# Patient Record
Sex: FEMALE | Race: WHITE | NOT HISPANIC OR LATINO | URBAN - METROPOLITAN AREA
[De-identification: names, ages, dates, MRNs, and addresses within clinical notes are randomized per-mention and may not be internally consistent; named-entity substitution may affect disease eponyms.]

---

## 2017-02-24 RX ORDER — PITAVASTATIN CALCIUM 1.04 MG/1
0 TABLET, FILM COATED ORAL
Qty: 90 | Refills: 0 | COMMUNITY
Start: 2017-02-24

## 2017-04-03 ENCOUNTER — INPATIENT (INPATIENT)
Facility: HOSPITAL | Age: 77
LOS: 1 days | Discharge: HOME | End: 2017-04-05
Attending: HOSPITALIST | Admitting: FAMILY MEDICINE
Payer: MEDICARE

## 2017-04-03 VITALS — WEIGHT: 123.9 LBS | HEIGHT: 62 IN

## 2017-04-03 DIAGNOSIS — Z90.49 ACQUIRED ABSENCE OF OTHER SPECIFIED PARTS OF DIGESTIVE TRACT: Chronic | ICD-10-CM

## 2017-04-03 DIAGNOSIS — E78.5 HYPERLIPIDEMIA, UNSPECIFIED: ICD-10-CM

## 2017-04-03 DIAGNOSIS — I21.4 NON-ST ELEVATION (NSTEMI) MYOCARDIAL INFARCTION: ICD-10-CM

## 2017-04-03 LAB
ALBUMIN SERPL ELPH-MCNC: 3.3 G/DL — SIGNIFICANT CHANGE UP (ref 3.3–5)
ALP SERPL-CCNC: 70 U/L — SIGNIFICANT CHANGE UP (ref 40–120)
ALT FLD-CCNC: 15 U/L — SIGNIFICANT CHANGE UP (ref 12–78)
ANION GAP SERPL CALC-SCNC: 10 MMOL/L — SIGNIFICANT CHANGE UP (ref 5–17)
APTT BLD: 27.6 SEC — SIGNIFICANT CHANGE UP (ref 27.5–37.4)
APTT BLD: 30.6 SEC — SIGNIFICANT CHANGE UP (ref 27.5–37.4)
AST SERPL-CCNC: 28 U/L — SIGNIFICANT CHANGE UP (ref 15–37)
BASOPHILS # BLD AUTO: 0 K/UL — SIGNIFICANT CHANGE UP (ref 0–0.2)
BASOPHILS NFR BLD AUTO: 0.3 % — SIGNIFICANT CHANGE UP (ref 0–2)
BILIRUB SERPL-MCNC: 0.7 MG/DL — SIGNIFICANT CHANGE UP (ref 0.2–1.2)
BUN SERPL-MCNC: 13 MG/DL — SIGNIFICANT CHANGE UP (ref 7–23)
CALCIUM SERPL-MCNC: 8.6 MG/DL — SIGNIFICANT CHANGE UP (ref 8.5–10.1)
CHLORIDE SERPL-SCNC: 104 MMOL/L — SIGNIFICANT CHANGE UP (ref 96–108)
CK SERPL-CCNC: 53 U/L — SIGNIFICANT CHANGE UP (ref 26–192)
CO2 SERPL-SCNC: 26 MMOL/L — SIGNIFICANT CHANGE UP (ref 22–31)
CREAT SERPL-MCNC: 0.96 MG/DL — SIGNIFICANT CHANGE UP (ref 0.5–1.3)
D DIMER BLD IA.RAPID-MCNC: 339 NG/ML DDU — HIGH
EOSINOPHIL # BLD AUTO: 0 K/UL — SIGNIFICANT CHANGE UP (ref 0–0.5)
EOSINOPHIL NFR BLD AUTO: 0.1 % — SIGNIFICANT CHANGE UP (ref 0–6)
GLUCOSE SERPL-MCNC: 197 MG/DL — HIGH (ref 70–99)
HCT VFR BLD CALC: 35.6 % — SIGNIFICANT CHANGE UP (ref 34.5–45)
HCT VFR BLD CALC: 38.1 % — SIGNIFICANT CHANGE UP (ref 34.5–45)
HCT VFR BLD CALC: 38.7 % — SIGNIFICANT CHANGE UP (ref 34.5–45)
HGB BLD-MCNC: 12.2 G/DL — SIGNIFICANT CHANGE UP (ref 11.5–15.5)
HGB BLD-MCNC: 13.1 G/DL — SIGNIFICANT CHANGE UP (ref 11.5–15.5)
HGB BLD-MCNC: 13.4 G/DL — SIGNIFICANT CHANGE UP (ref 11.5–15.5)
INR BLD: 1.13 RATIO — SIGNIFICANT CHANGE UP (ref 0.88–1.16)
LYMPHOCYTES # BLD AUTO: 1.8 K/UL — SIGNIFICANT CHANGE UP (ref 1–3.3)
LYMPHOCYTES # BLD AUTO: 14.9 % — SIGNIFICANT CHANGE UP (ref 13–44)
MCHC RBC-ENTMCNC: 31.7 PG — SIGNIFICANT CHANGE UP (ref 27–34)
MCHC RBC-ENTMCNC: 31.8 PG — SIGNIFICANT CHANGE UP (ref 27–34)
MCHC RBC-ENTMCNC: 32.3 PG — SIGNIFICANT CHANGE UP (ref 27–34)
MCHC RBC-ENTMCNC: 34.3 GM/DL — SIGNIFICANT CHANGE UP (ref 32–36)
MCHC RBC-ENTMCNC: 34.5 GM/DL — SIGNIFICANT CHANGE UP (ref 32–36)
MCHC RBC-ENTMCNC: 34.7 GM/DL — SIGNIFICANT CHANGE UP (ref 32–36)
MCV RBC AUTO: 92.1 FL — SIGNIFICANT CHANGE UP (ref 80–100)
MCV RBC AUTO: 92.6 FL — SIGNIFICANT CHANGE UP (ref 80–100)
MCV RBC AUTO: 93 FL — SIGNIFICANT CHANGE UP (ref 80–100)
MONOCYTES # BLD AUTO: 0.7 K/UL — SIGNIFICANT CHANGE UP (ref 0–0.9)
MONOCYTES NFR BLD AUTO: 6 % — SIGNIFICANT CHANGE UP (ref 2–14)
NEUTROPHILS # BLD AUTO: 9.5 K/UL — HIGH (ref 1.8–7.4)
NEUTROPHILS NFR BLD AUTO: 78.6 % — HIGH (ref 43–77)
PLATELET # BLD AUTO: 185 K/UL — SIGNIFICANT CHANGE UP (ref 150–400)
PLATELET # BLD AUTO: 196 K/UL — SIGNIFICANT CHANGE UP (ref 150–400)
PLATELET # BLD AUTO: 211 K/UL — SIGNIFICANT CHANGE UP (ref 150–400)
POTASSIUM SERPL-MCNC: 3.7 MMOL/L — SIGNIFICANT CHANGE UP (ref 3.5–5.3)
POTASSIUM SERPL-SCNC: 3.7 MMOL/L — SIGNIFICANT CHANGE UP (ref 3.5–5.3)
PROT SERPL-MCNC: 7.3 GM/DL — SIGNIFICANT CHANGE UP (ref 6–8.3)
PROTHROM AB SERPL-ACNC: 12.2 SEC — SIGNIFICANT CHANGE UP (ref 9.8–12.7)
RBC # BLD: 3.85 M/UL — SIGNIFICANT CHANGE UP (ref 3.8–5.2)
RBC # BLD: 4.13 M/UL — SIGNIFICANT CHANGE UP (ref 3.8–5.2)
RBC # BLD: 4.16 M/UL — SIGNIFICANT CHANGE UP (ref 3.8–5.2)
RBC # FLD: 12.5 % — SIGNIFICANT CHANGE UP (ref 10.3–14.5)
RBC # FLD: 12.6 % — SIGNIFICANT CHANGE UP (ref 10.3–14.5)
RBC # FLD: 12.8 % — SIGNIFICANT CHANGE UP (ref 10.3–14.5)
SODIUM SERPL-SCNC: 140 MMOL/L — SIGNIFICANT CHANGE UP (ref 135–145)
TROPONIN I SERPL-MCNC: 0.83 NG/ML — HIGH (ref 0.01–0.04)
TROPONIN I SERPL-MCNC: 1.08 NG/ML — HIGH (ref 0.01–0.04)
TROPONIN I SERPL-MCNC: 1.11 NG/ML — HIGH (ref 0.01–0.04)
WBC # BLD: 12.1 K/UL — HIGH (ref 3.8–10.5)
WBC # BLD: 8.7 K/UL — SIGNIFICANT CHANGE UP (ref 3.8–10.5)
WBC # BLD: 9.2 K/UL — SIGNIFICANT CHANGE UP (ref 3.8–10.5)
WBC # FLD AUTO: 12.1 K/UL — HIGH (ref 3.8–10.5)
WBC # FLD AUTO: 8.7 K/UL — SIGNIFICANT CHANGE UP (ref 3.8–10.5)
WBC # FLD AUTO: 9.2 K/UL — SIGNIFICANT CHANGE UP (ref 3.8–10.5)

## 2017-04-03 PROCEDURE — 93010 ELECTROCARDIOGRAM REPORT: CPT

## 2017-04-03 PROCEDURE — 99285 EMERGENCY DEPT VISIT HI MDM: CPT

## 2017-04-03 PROCEDURE — 93306 TTE W/DOPPLER COMPLETE: CPT | Mod: 26

## 2017-04-03 RX ORDER — OMEGA-3 ACID ETHYL ESTERS 1 G
0 CAPSULE ORAL
Qty: 0 | Refills: 0 | COMMUNITY

## 2017-04-03 RX ORDER — HEPARIN SODIUM 5000 [USP'U]/ML
3500 INJECTION INTRAVENOUS; SUBCUTANEOUS EVERY 6 HOURS
Qty: 0 | Refills: 0 | Status: DISCONTINUED | OUTPATIENT
Start: 2017-04-03 | End: 2017-04-03

## 2017-04-03 RX ORDER — MORPHINE SULFATE 50 MG/1
0.5 CAPSULE, EXTENDED RELEASE ORAL EVERY 6 HOURS
Qty: 0 | Refills: 0 | Status: DISCONTINUED | OUTPATIENT
Start: 2017-04-03 | End: 2017-04-05

## 2017-04-03 RX ORDER — MORPHINE SULFATE 50 MG/1
1 CAPSULE, EXTENDED RELEASE ORAL EVERY 8 HOURS
Qty: 0 | Refills: 0 | Status: DISCONTINUED | OUTPATIENT
Start: 2017-04-03 | End: 2017-04-03

## 2017-04-03 RX ORDER — HEPARIN SODIUM 5000 [USP'U]/ML
INJECTION INTRAVENOUS; SUBCUTANEOUS
Qty: 25000 | Refills: 0 | Status: DISCONTINUED | OUTPATIENT
Start: 2017-04-03 | End: 2017-04-03

## 2017-04-03 RX ORDER — ASPIRIN/CALCIUM CARB/MAGNESIUM 324 MG
325 TABLET ORAL ONCE
Qty: 0 | Refills: 0 | Status: COMPLETED | OUTPATIENT
Start: 2017-04-03 | End: 2017-04-03

## 2017-04-03 RX ORDER — METOPROLOL TARTRATE 50 MG
25 TABLET ORAL
Qty: 0 | Refills: 0 | Status: DISCONTINUED | OUTPATIENT
Start: 2017-04-03 | End: 2017-04-03

## 2017-04-03 RX ORDER — MAGNESIUM OXIDE 400 MG ORAL TABLET 241.3 MG
0 TABLET ORAL
Qty: 0 | Refills: 0 | COMMUNITY

## 2017-04-03 RX ORDER — METOPROLOL TARTRATE 50 MG
25 TABLET ORAL EVERY 12 HOURS
Qty: 0 | Refills: 0 | Status: DISCONTINUED | OUTPATIENT
Start: 2017-04-03 | End: 2017-04-03

## 2017-04-03 RX ORDER — METOPROLOL TARTRATE 50 MG
25 TABLET ORAL EVERY 12 HOURS
Qty: 0 | Refills: 0 | Status: DISCONTINUED | OUTPATIENT
Start: 2017-04-03 | End: 2017-04-05

## 2017-04-03 RX ORDER — SODIUM CHLORIDE 9 MG/ML
250 INJECTION INTRAMUSCULAR; INTRAVENOUS; SUBCUTANEOUS
Qty: 0 | Refills: 0 | Status: DISCONTINUED | OUTPATIENT
Start: 2017-04-03 | End: 2017-04-04

## 2017-04-03 RX ORDER — ASPIRIN/CALCIUM CARB/MAGNESIUM 324 MG
81 TABLET ORAL DAILY
Qty: 0 | Refills: 0 | Status: DISCONTINUED | OUTPATIENT
Start: 2017-04-03 | End: 2017-04-03

## 2017-04-03 RX ORDER — SODIUM CHLORIDE 9 MG/ML
3 INJECTION INTRAMUSCULAR; INTRAVENOUS; SUBCUTANEOUS ONCE
Qty: 0 | Refills: 0 | Status: COMPLETED | OUTPATIENT
Start: 2017-04-03 | End: 2017-04-03

## 2017-04-03 RX ORDER — DOCUSATE SODIUM 100 MG
100 CAPSULE ORAL THREE TIMES A DAY
Qty: 0 | Refills: 0 | Status: DISCONTINUED | OUTPATIENT
Start: 2017-04-03 | End: 2017-04-05

## 2017-04-03 RX ORDER — SODIUM CHLORIDE 9 MG/ML
1000 INJECTION INTRAMUSCULAR; INTRAVENOUS; SUBCUTANEOUS
Qty: 0 | Refills: 0 | Status: DISCONTINUED | OUTPATIENT
Start: 2017-04-03 | End: 2017-04-04

## 2017-04-03 RX ORDER — ONDANSETRON 8 MG/1
4 TABLET, FILM COATED ORAL EVERY 6 HOURS
Qty: 0 | Refills: 0 | Status: DISCONTINUED | OUTPATIENT
Start: 2017-04-03 | End: 2017-04-05

## 2017-04-03 RX ORDER — ASPIRIN/CALCIUM CARB/MAGNESIUM 324 MG
325 TABLET ORAL DAILY
Qty: 0 | Refills: 0 | Status: DISCONTINUED | OUTPATIENT
Start: 2017-04-04 | End: 2017-04-04

## 2017-04-03 RX ORDER — SENNA PLUS 8.6 MG/1
2 TABLET ORAL AT BEDTIME
Qty: 0 | Refills: 0 | Status: DISCONTINUED | OUTPATIENT
Start: 2017-04-03 | End: 2017-04-05

## 2017-04-03 RX ORDER — HEPARIN SODIUM 5000 [USP'U]/ML
3500 INJECTION INTRAVENOUS; SUBCUTANEOUS ONCE
Qty: 0 | Refills: 0 | Status: COMPLETED | OUTPATIENT
Start: 2017-04-03 | End: 2017-04-03

## 2017-04-03 RX ORDER — ATORVASTATIN CALCIUM 80 MG/1
40 TABLET, FILM COATED ORAL AT BEDTIME
Qty: 0 | Refills: 0 | Status: DISCONTINUED | OUTPATIENT
Start: 2017-04-03 | End: 2017-04-05

## 2017-04-03 RX ORDER — TICAGRELOR 90 MG/1
180 TABLET ORAL ONCE
Qty: 0 | Refills: 0 | Status: DISCONTINUED | OUTPATIENT
Start: 2017-04-03 | End: 2017-04-03

## 2017-04-03 RX ORDER — ASPIRIN/CALCIUM CARB/MAGNESIUM 324 MG
325 TABLET ORAL DAILY
Qty: 0 | Refills: 0 | Status: DISCONTINUED | OUTPATIENT
Start: 2017-04-03 | End: 2017-04-03

## 2017-04-03 RX ADMIN — SODIUM CHLORIDE 75 MILLILITER(S): 9 INJECTION INTRAMUSCULAR; INTRAVENOUS; SUBCUTANEOUS at 17:15

## 2017-04-03 RX ADMIN — Medication 25 MILLIGRAM(S): at 20:05

## 2017-04-03 RX ADMIN — MORPHINE SULFATE 1 MILLIGRAM(S): 50 CAPSULE, EXTENDED RELEASE ORAL at 21:36

## 2017-04-03 RX ADMIN — SODIUM CHLORIDE 3 MILLILITER(S): 9 INJECTION INTRAMUSCULAR; INTRAVENOUS; SUBCUTANEOUS at 11:55

## 2017-04-03 RX ADMIN — HEPARIN SODIUM 700 UNIT(S)/HR: 5000 INJECTION INTRAVENOUS; SUBCUTANEOUS at 12:18

## 2017-04-03 RX ADMIN — SODIUM CHLORIDE 250 MILLILITER(S): 9 INJECTION INTRAMUSCULAR; INTRAVENOUS; SUBCUTANEOUS at 16:44

## 2017-04-03 RX ADMIN — Medication 100 MILLIGRAM(S): at 21:26

## 2017-04-03 RX ADMIN — HEPARIN SODIUM 3500 UNIT(S): 5000 INJECTION INTRAVENOUS; SUBCUTANEOUS at 12:17

## 2017-04-03 RX ADMIN — Medication 325 MILLIGRAM(S): at 11:56

## 2017-04-03 RX ADMIN — MORPHINE SULFATE 1 MILLIGRAM(S): 50 CAPSULE, EXTENDED RELEASE ORAL at 20:05

## 2017-04-03 NOTE — ED PROVIDER NOTE - NS ED MD SCRIBE ATTENDING SCRIBE SECTIONS
DISPOSITION/REVIEW OF SYSTEMS/PAST MEDICAL/SURGICAL/SOCIAL HISTORY/PHYSICAL EXAM/HISTORY OF PRESENT ILLNESS/PROGRESS NOTE/VITAL SIGNS( Pullset)/RESULTS

## 2017-04-03 NOTE — CHART NOTE - NSCHARTNOTEFT_GEN_A_CORE
Nurse Practitioner Progress note:     HPI:  77 year old female with PMH of macular degeneration, hyperlipidemia, distant history of tobacco smoking who presents with 5 days of B/L shoulder pain radiating to substernal area with pressure like pain/burning sensation in neck at rest and on exertion, accompanied with mild SOB, fatigue and recurrent diaphoresis at rest. Stress test in 10/2016 was negative as per pt.    Troponin elevation 1.080 noted., pt awaiting coronary cath.     PHYSICAL EXAM:  Neurologic: Non-focal, AxOx3.  No neuro deficits  Vascular: Peripheral pulses palpable 2+ bilaterally  Procedure Site: Rt. femoral sheath pulled manual pressure applied x15 minutes site benign soft no bleeding no hematoma +1PP    12 lead EKG: ECG shows T wave inversions in V4-6.	          PROCEDURE RESULTS: Non-obstructive CAD, Takotsubo    ASSESSMENT/PLAN:   -Admit to CCU	  -VS, labs, diet, activity as per post cath orders  -IV hydration  -Encourage PO fluids  -Aspirin 325mg   -Lopressor 25mg BID  -2D-echo  -Plan of care D/W pt. and MD  -Post cath instructions reviewed with pt., pt. verbalizes and understands instructions  -Follow-up with attending

## 2017-04-03 NOTE — ED STATDOCS - CARE PLAN
Principal Discharge DX:	Chest pain, unspecified type  Secondary Diagnosis:	EKG, abnormal Principal Discharge DX:	NSTEMI (non-ST elevated myocardial infarction)  Secondary Diagnosis:	EKG, abnormal  Secondary Diagnosis:	Chest pain, unspecified type

## 2017-04-03 NOTE — H&P ADULT - NSHPSOCIALHISTORY_GEN_ALL_CORE
Lives alone with her 13 yr old dog  Past smoker (5-6 cig/day over 50 yr ago for 10 yr)  Denies alcohol or drug use  Retired

## 2017-04-03 NOTE — H&P ADULT - HISTORY OF PRESENT ILLNESS
77 year old female with history of macular degeneration, hyperlipidemia, distant history of tobacco smoking who presents with 5 days of bilateral shoulder pain radiating to substernal area with pressure like pain and burning sensation in neck at rest and on exertion.  Pt also reports fatigue on exertion and mild SOB and recurrent diaphoresis at rest over last 5 days.  She denies past history of cardiac issues.  Stress test in 10/2016 was negative as per pt.  Pt has had carotid sonograms, duplex of legs, echocardiogram over last year for routine check up which were negative as per pt.      Pt denies orthopnea, PND, cough, cold, recent sickness, recent travel, headache, loss of appetite, dizziness, inability to ambulate or visual changes.      Pt seen bed side and evaluated.  She c/o continued chest pressure.  ECG shows T wave inversions in V4-6.  Troponin elevation 1.080 noted.  Cardiology consult appreciated in ER, pt awaiting coronary cath.

## 2017-04-03 NOTE — ED STATDOCS - NS ED MD SCRIBE ATTENDING SCRIBE SECTIONS
RESULTS/DISPOSITION/PROGRESS NOTE/REVIEW OF SYSTEMS/PAST MEDICAL/SURGICAL/SOCIAL HISTORY/HISTORY OF PRESENT ILLNESS/PHYSICAL EXAM

## 2017-04-03 NOTE — H&P ADULT - NSHPLABSRESULTS_GEN_ALL_CORE
T(C): 37.2, Max: 37.2 (04-03 @ 12:53)  HR: 91 (91 - 122)  BP: 121/72 (121/72 - 146/87)  RR: 19 (18 - 19)  SpO2: 100% (98% - 100%)  Wt(kg): --    CARDIAC MARKERS ( 03 Apr 2017 11:06 )  1.080 ng/mL / x     / x     / x     / x                                13.4   12.1  )-----------( 211      ( 03 Apr 2017 11:06 )             38.7     03 Apr 2017 11:06    140    |  104    |  13     ----------------------------<  197    3.7     |  26     |  0.96     Ca    8.6        03 Apr 2017 11:06    TPro  7.3    /  Alb  3.3    /  TBili  0.7    /  DBili  x      /  AST  28     /  ALT  15     /  AlkPhos  70     03 Apr 2017 11:06    PT/INR - ( 03 Apr 2017 11:06 )   PT: 12.2 sec;   INR: 1.13 ratio         PTT - ( 03 Apr 2017 11:06 )  PTT:27.6 sec  CAPILLARY BLOOD GLUCOSE    LIVER FUNCTIONS - ( 03 Apr 2017 11:06 )  Alb: 3.3 g/dL / Pro: 7.3 gm/dL / ALK PHOS: 70 U/L / ALT: 15 U/L / AST: 28 U/L / GGT: x

## 2017-04-03 NOTE — ED STATDOCS - OBJECTIVE STATEMENT
76 y/o F with no PMHx presents to the ED c/o CP for the past 5 days that radiates to sternum and neck. Pt states that she has been taking Advil with alleviation of symptoms. Pt currently calm, describes pain 6/10 and denies NVD, SOB, HA, fever or any other acute c/o at this time.

## 2017-04-03 NOTE — ED ADULT NURSE NOTE - OBJECTIVE STATEMENT
pt received in er supertrack. pt is awake alert oriented following commands and speaking coherently. pt presents to er complaining of chest pain x 4 days. pt states she has no cardiac hx. pain does not radiate. denies nausea vomiting fever chills sob headache dizziness lightheadedness abdominal pain and urinary symptoms. sinus tach on monitor. lung sounds cta bl. resp even and unlabored. ambulatory with steady gait

## 2017-04-03 NOTE — ED STATDOCS - PROGRESS NOTE DETAILS
Spoke with Dr. Wood. Will send his Np to come see pt and ekg. Cardio NP came to see pt and texted ekg to Dr. Wood. Called her back, states she will order heparin, state echo, and d-dimer and discuss admission with . - Enzo Allen PA-C

## 2017-04-03 NOTE — CONSULT NOTE ADULT - ASSESSMENT
Stress induced cardiomyopathy- likely secondary to stressors in life at this time, 's anniversary.  Will hold off on GDMT tonight at this time as she is hypovolemic.  Echo revealed LVOT obstrcution from [facility]-  hydrate for now with NSS 75cc/hr, reasses gradient tomrrow after hydration.    Hyperlipidemia- continue statin.  Thank you for allowing me to participate in the care of this patient. Please feel free to contact me with any questions.

## 2017-04-03 NOTE — CONSULT NOTE ADULT - SUBJECTIVE AND OBJECTIVE BOX
Patient is a 77y old  Female who presents with a chief complaint of Chest pain       HPI:  77 year old female with history of macular degeneration, hyperlipidemia, distant history of tobacco smoking who presents with 5 days of bilateral shoulder pain radiating to substernal area with pressure like pain and burning sensation in neck at rest and on exertion.  Pt also reports fatigue on exertion and mild SOB and recurrent diaphoresis at rest over last 5 days.  She denies past history of cardiac issues.  Stress test in 10/2016 was negative as per pt.  Pt has had carotid sonograms, duplex of legs, echocardiogram over last year for routine check up which were negative as per pt.      Pt denies orthopnea, PND, cough, cold, recent sickness, recent travel, headache, loss of appetite, dizziness, inability to ambulate or visual changes.      Pt seen bed side and evaluated.  She c/o continued chest pressure and underwent left heart cath which showed nonobstructive CAD. Pt still c/o burning sensation in her lowerneck and upper chest area.  Denies any syncope or dizziness.    PAST MEDICAL & SURGICAL HISTORY:  Macular degeneration  Hyperlipidemia, unspecified hyperlipidemia type      MEDICATIONS  (STANDING):  atorvastatin 40milliGRAM(s) Oral at bedtime  docusate sodium 100milliGRAM(s) Oral three times a day  multivitamin 1Tablet(s) Oral daily  ticagrelor 180milliGRAM(s) Oral once    MEDICATIONS  (PRN):  ondansetron Injectable 4milliGRAM(s) IV Push every 6 hours PRN Nausea  senna 2Tablet(s) Oral at bedtime PRN Constipation      FAMILY HISTORY:  No pertinent family history in first degree relatives      SOCIAL HISTORY:  non smoker, no alcohol use      REVIEW OF SYSTEMS:  CONSTITUTIONAL:  No night sweats.  No fatigue, malaise, lethargy.  No fever or chills.  HEENT:  Eyes:  No visual changes.  No eye pain.      ENT:  No runny nose.  No epistaxis.  No sinus pain.  No sore throat.  No odynophagia.  No ear pain.  No congestion.  RESPIRATORY:  No cough.  No wheeze.  No hemoptysis.  No shortness of breath.  CARDIOVASCULAR:  No chest pains.  No palpitations. No shortness of breath, No orthopnea or PND.  GASTROINTESTINAL:  No abdominal pain.  No nausea or vomiting.  No diarrhea or constipation.  No hematemesis.  No hematochezia.  No melena.  GENITOURINARY:  No urgency.  No frequency.  No dysuria.  No hematuria.  No obstructive symptoms.  No discharge.  No pain.  No significant abnormal bleeding.  MUSCULOSKELETAL:  No musculoskeletal pain.  No joint swelling.  No arthritis.  NEUROLOGICAL:  No tingling or numbness or weakness.  PSYCHIATRIC:  No confusion  SKIN:  No rashes.  No lesions.  No wounds.  ENDOCRINE:  No unexplained weight loss.  No polydipsia.  No polyuria.  No polyphagia.  HEMATOLOGIC:  No anemia.  No purpura.  No petechiae.  No prolonged or excessive bleeding.   ALLERGIC AND IMMUNOLOGIC:  No pruritus.  No swelling.         Vital Signs Last 24 Hrs  T(C): 36.4, Max: 37.2 (04-03 @ 12:53)  T(F): 97.5, Max: 99 (04-03 @ 12:53)  HR: 77 (16 - 122)  BP: 108/63 (108/63 - 146/87)  BP(mean): --  RR: 16 (16 - 19)  SpO2: 99% (98% - 100%)    PHYSICAL EXAM-    Constitutional: The patient appears to be normal, well developed, well nourished and alert and oriented to time, place and person. The patient does not appear acutely ill.     Head: Head is normocephalic and atraumatic.      Neck: The patient's neck is supple without enlargement, has no palpable thyromegaly nor thyroid nodules and has no jugular venous distention. No audible carotid bruits. There are strong carotid pulses bilaterally. No JVD.     Cardiovascular: Regular rate and rhythm without S3, S4.systolic  murmurs 3/6    Respiratory: Breathsounds are normal. No rales. No wheezing.    Abdomen: Soft, nontender, nondistended with positive bowel sounds.      Extremity: No tenderness. No  pitting edema No skin discoloration No clubbing No cyanosis.     Neurologic: The patient is alert and oriented.      Skin: No rash, no obvious lesions noted.      Psychiatric: The patient appears to be emotionally stable.      INTERPRETATION OF TELEMETRY:    ECG:sinus rythm T wave inversion in V3-6, III    I&O's Detail    I & Os for current day (as of 03 Apr 2017 16:30)  =============================================  IN:    Oral Fluid: 120 ml    Total IN: 120 ml  ---------------------------------------------  OUT:    Voided: 500 ml    Total OUT: 500 ml  ---------------------------------------------  Total NET: -380 ml      LABS:                        13.4   12.1  )-----------( 211      ( 03 Apr 2017 11:06 )             38.7     03 Apr 2017 11:06    140    |  104    |  13     ----------------------------<  197    3.7     |  26     |  0.96     Ca    8.6        03 Apr 2017 11:06    TPro  7.3    /  Alb  3.3    /  TBili  0.7    /  DBili  x      /  AST  28     /  ALT  15     /  AlkPhos  70     03 Apr 2017 11:06    CARDIAC MARKERS ( 03 Apr 2017 12:49 )  1.110 ng/mL / x     / x     / x     / x      CARDIAC MARKERS ( 03 Apr 2017 11:06 )  1.080 ng/mL / x     / x     / x     / x          PT/INR - ( 03 Apr 2017 11:06 )   PT: 12.2 sec;   INR: 1.13 ratio         PTT - ( 03 Apr 2017 11:06 )  PTT:27.6 sec    I&O's Summary    I & Os for current day (as of 03 Apr 2017 16:30)  =============================================  IN: 120 ml / OUT: 500 ml / NET: -380 ml    BNP  RADIOLOGY & ADDITIONAL STUDIES:

## 2017-04-03 NOTE — H&P ADULT - NSHPREVIEWOFSYSTEMS_GEN_ALL_CORE
REVIEW OF SYSTEMS:    CONSTITUTIONAL: No weakness, fevers or chills  EYES/ENT: No visual changes;  No vertigo or throat pain   NECK: No pain or stiffness  RESPIRATORY: No cough, wheezing, hemoptysis; No shortness of breath  CARDIOVASCULAR: + chest pain as per HPI   GASTROINTESTINAL: No abdominal or epigastric pain. No nausea, vomiting, or hematemesis; No diarrhea or constipation. No melena or hematochezia.  GENITOURINARY: No dysuria, frequency or hematuria  NEUROLOGICAL: No numbness or weakness  SKIN: No itching, burning, rashes, or lesions   All other review of systems is negative unless indicated above.

## 2017-04-03 NOTE — ED PROVIDER NOTE - OBJECTIVE STATEMENT
78 y/o female with no PMHx presents to the ED c/o CP x 5 days radiating to sternum and neck. Pt states she feels the same at this time, but the pain is more "behind the collarbone". Deneis SOB, N/V/D, or any other sx. Sees a cardiologist regularly since the age of 65. States the last time she had blood work done, her LDLs were borderline high so the doctor gave her 1 month of medication. Otherwise does not take any daily meds. Has a stress test annually, last one was in October. Former smoker, quit 50 years ago. No FMHx. PMD Dr. Paris. 76 y/o female with no PMHx presents to the ED c/o CP x 5 days radiating to sternum and neck. Pt states she feels the same at this time, but the pain is more "behind the collarbone". Deneis SOB, N/V/D, or any other sx. Sees a cardiologist regularly since the age of 65. States the last time she had blood work done, her LDLs were borderline high so the doctor gave her 1 month of medication. Otherwise does not take any daily meds. Has a stress test annually, last one was in October. Former smoker, quit 50 years ago. No FMHx. PMD Dr. Paris. Cardio Dr. Jaylan Grande in [place].

## 2017-04-03 NOTE — H&P ADULT - PROBLEM SELECTOR PLAN 1
- Chest pain, T wave inversion V4-6 and Troponin 1.080   - STAT cardio consult appreciated   - Pt awaiting coronary cath  - Aspirin 325mg and Heparin IV drip started  - Brilinta 180 mg once given STAT for DAPT   - Metoprolol 25mg BID started   - Lipitor started STAT  - F/U after coronary cath  - Pt education provided

## 2017-04-04 LAB
ANION GAP SERPL CALC-SCNC: 9 MMOL/L — SIGNIFICANT CHANGE UP (ref 5–17)
APTT BLD: 28.5 SEC — SIGNIFICANT CHANGE UP (ref 27.5–37.4)
BUN SERPL-MCNC: 11 MG/DL — SIGNIFICANT CHANGE UP (ref 7–23)
CALCIUM SERPL-MCNC: 8.1 MG/DL — LOW (ref 8.5–10.1)
CHLORIDE SERPL-SCNC: 113 MMOL/L — HIGH (ref 96–108)
CHOLEST SERPL-MCNC: 116 MG/DL — SIGNIFICANT CHANGE UP (ref 10–199)
CK SERPL-CCNC: 45 U/L — SIGNIFICANT CHANGE UP (ref 26–192)
CK SERPL-CCNC: 45 U/L — SIGNIFICANT CHANGE UP (ref 26–192)
CO2 SERPL-SCNC: 23 MMOL/L — SIGNIFICANT CHANGE UP (ref 22–31)
CREAT SERPL-MCNC: 0.63 MG/DL — SIGNIFICANT CHANGE UP (ref 0.5–1.3)
GLUCOSE SERPL-MCNC: 101 MG/DL — HIGH (ref 70–99)
HCT VFR BLD CALC: 34.2 % — LOW (ref 34.5–45)
HDLC SERPL-MCNC: 47 MG/DL — SIGNIFICANT CHANGE UP (ref 40–125)
HGB BLD-MCNC: 11.7 G/DL — SIGNIFICANT CHANGE UP (ref 11.5–15.5)
INR BLD: 1.1 RATIO — SIGNIFICANT CHANGE UP (ref 0.88–1.16)
LIPID PNL WITH DIRECT LDL SERPL: 58 MG/DL — SIGNIFICANT CHANGE UP
MAGNESIUM SERPL-MCNC: 2.2 MG/DL — SIGNIFICANT CHANGE UP (ref 1.8–2.4)
MCHC RBC-ENTMCNC: 31.4 PG — SIGNIFICANT CHANGE UP (ref 27–34)
MCHC RBC-ENTMCNC: 34.2 GM/DL — SIGNIFICANT CHANGE UP (ref 32–36)
MCV RBC AUTO: 92.1 FL — SIGNIFICANT CHANGE UP (ref 80–100)
PLATELET # BLD AUTO: 152 K/UL — SIGNIFICANT CHANGE UP (ref 150–400)
POTASSIUM SERPL-MCNC: 4 MMOL/L — SIGNIFICANT CHANGE UP (ref 3.5–5.3)
POTASSIUM SERPL-SCNC: 4 MMOL/L — SIGNIFICANT CHANGE UP (ref 3.5–5.3)
PROTHROM AB SERPL-ACNC: 11.9 SEC — SIGNIFICANT CHANGE UP (ref 9.8–12.7)
RBC # BLD: 3.72 M/UL — LOW (ref 3.8–5.2)
RBC # FLD: 12.4 % — SIGNIFICANT CHANGE UP (ref 10.3–14.5)
SODIUM SERPL-SCNC: 145 MMOL/L — SIGNIFICANT CHANGE UP (ref 135–145)
TOTAL CHOLESTEROL/HDL RATIO MEASUREMENT: 2.5 RATIO — LOW (ref 3.3–7.1)
TRIGL SERPL-MCNC: 53 MG/DL — SIGNIFICANT CHANGE UP (ref 10–149)
TROPONIN I SERPL-MCNC: 0.73 NG/ML — HIGH (ref 0.01–0.04)
TROPONIN I SERPL-MCNC: 0.92 NG/ML — HIGH (ref 0.01–0.04)
WBC # BLD: 7.4 K/UL — SIGNIFICANT CHANGE UP (ref 3.8–10.5)
WBC # FLD AUTO: 7.4 K/UL — SIGNIFICANT CHANGE UP (ref 3.8–10.5)

## 2017-04-04 PROCEDURE — 93010 ELECTROCARDIOGRAM REPORT: CPT

## 2017-04-04 PROCEDURE — 93308 TTE F-UP OR LMTD: CPT | Mod: 26

## 2017-04-04 RX ORDER — ZALEPLON 10 MG
5 CAPSULE ORAL AT BEDTIME
Qty: 0 | Refills: 0 | Status: DISCONTINUED | OUTPATIENT
Start: 2017-04-04 | End: 2017-04-05

## 2017-04-04 RX ORDER — ASPIRIN/CALCIUM CARB/MAGNESIUM 324 MG
81 TABLET ORAL DAILY
Qty: 0 | Refills: 0 | Status: DISCONTINUED | OUTPATIENT
Start: 2017-04-05 | End: 2017-04-05

## 2017-04-04 RX ORDER — SODIUM CHLORIDE 9 MG/ML
250 INJECTION INTRAMUSCULAR; INTRAVENOUS; SUBCUTANEOUS ONCE
Qty: 0 | Refills: 0 | Status: COMPLETED | OUTPATIENT
Start: 2017-04-04 | End: 2017-04-04

## 2017-04-04 RX ADMIN — Medication 25 MILLIGRAM(S): at 17:46

## 2017-04-04 RX ADMIN — Medication 1 TABLET(S): at 12:36

## 2017-04-04 RX ADMIN — Medication 25 MILLIGRAM(S): at 05:43

## 2017-04-04 RX ADMIN — SODIUM CHLORIDE 250 MILLILITER(S): 9 INJECTION INTRAMUSCULAR; INTRAVENOUS; SUBCUTANEOUS at 13:09

## 2017-04-04 RX ADMIN — Medication 325 MILLIGRAM(S): at 12:36

## 2017-04-04 RX ADMIN — Medication 100 MILLIGRAM(S): at 22:07

## 2017-04-04 NOTE — PROGRESS NOTE ADULT - PROBLEM SELECTOR PLAN 1
- NSTEMI from stress induced cardiomyopathy. Non-occlusive disease noted on cardiac cath (4/3).   - currently stress pain free.   - cont BB with borderline SBP - discussed with Dr. Arzola.   - hold off on AceI, cont Asp/ Statin.   - awaiting specific Echo today.   - gave 250cc bolus for dehydration this AM.   - tentative dc home tomorrow.

## 2017-04-05 VITALS — HEART RATE: 80 BPM

## 2017-04-05 LAB
HCT VFR BLD CALC: 31.9 % — LOW (ref 34.5–45)
HGB BLD-MCNC: 11.1 G/DL — LOW (ref 11.5–15.5)
MCHC RBC-ENTMCNC: 31.9 PG — SIGNIFICANT CHANGE UP (ref 27–34)
MCHC RBC-ENTMCNC: 34.6 GM/DL — SIGNIFICANT CHANGE UP (ref 32–36)
MCV RBC AUTO: 92.2 FL — SIGNIFICANT CHANGE UP (ref 80–100)
PLATELET # BLD AUTO: 180 K/UL — SIGNIFICANT CHANGE UP (ref 150–400)
RBC # BLD: 3.46 M/UL — LOW (ref 3.8–5.2)
RBC # FLD: 12.3 % — SIGNIFICANT CHANGE UP (ref 10.3–14.5)
WBC # BLD: 6.6 K/UL — SIGNIFICANT CHANGE UP (ref 3.8–10.5)
WBC # FLD AUTO: 6.6 K/UL — SIGNIFICANT CHANGE UP (ref 3.8–10.5)

## 2017-04-05 RX ORDER — PITAVASTATIN CALCIUM 1.04 MG/1
0 TABLET, FILM COATED ORAL
Qty: 0 | Refills: 0 | COMMUNITY

## 2017-04-05 RX ORDER — ASPIRIN/CALCIUM CARB/MAGNESIUM 324 MG
1 TABLET ORAL
Qty: 30 | Refills: 0 | OUTPATIENT
Start: 2017-04-05

## 2017-04-05 RX ORDER — METOPROLOL TARTRATE 50 MG
1 TABLET ORAL
Qty: 30 | Refills: 0 | OUTPATIENT
Start: 2017-04-05

## 2017-04-05 RX ORDER — ATORVASTATIN CALCIUM 80 MG/1
1 TABLET, FILM COATED ORAL
Qty: 30 | Refills: 0 | OUTPATIENT
Start: 2017-04-05

## 2017-04-05 RX ADMIN — Medication 81 MILLIGRAM(S): at 08:37

## 2017-04-05 RX ADMIN — Medication 1 TABLET(S): at 08:37

## 2017-04-05 RX ADMIN — Medication 25 MILLIGRAM(S): at 05:50

## 2017-04-05 RX ADMIN — Medication 100 MILLIGRAM(S): at 05:50

## 2017-04-05 NOTE — DISCHARGE NOTE ADULT - PLAN OF CARE
No chest pain or difficulty breathing Follow up with the Cardiologist in 1 week for repeat blood pressure check.

## 2017-04-05 NOTE — PROGRESS NOTE ADULT - SUBJECTIVE AND OBJECTIVE BOX
Patient is a 77y old  Female who presents with a chief complaint of Chest pain       HPI:  77 year old female with history of macular degeneration, hyperlipidemia, distant history of tobacco smoking who presents with 5 days of bilateral shoulder pain radiating to substernal area with pressure like pain and burning sensation in neck at rest and on exertion.  Pt also reports fatigue on exertion and mild SOB and recurrent diaphoresis at rest over last 5 days.  She denies past history of cardiac issues.  Stress test in 10/2016 was negative as per pt.  Pt has had carotid sonograms, duplex of legs, echocardiogram over last year for routine check up which were negative as per pt.      Pt underwent left heart cath  and did not reveal any obstructive CAD.  She was noted to have LVOT gradient that was high secondary to basal hyperkinesis and RACHEL on echo.  4/5- Pt seen and examined this am.          PAST MEDICAL & SURGICAL HISTORY:  Macular degeneration  Hyperlipidemia, unspecified hyperlipidemia type      MEDICATIONS  (STANDING):  atorvastatin 40milliGRAM(s) Oral at bedtime  docusate sodium 100milliGRAM(s) Oral three times a day  multivitamin 1Tablet(s) Oral daily  ticagrelor 180milliGRAM(s) Oral once    MEDICATIONS  (PRN):  ondansetron Injectable 4milliGRAM(s) IV Push every 6 hours PRN Nausea  senna 2Tablet(s) Oral at bedtime PRN Constipation      FAMILY HISTORY:  No pertinent family history in first degree relatives      SOCIAL HISTORY:  non smoker, no alcohol use      REVIEW OF SYSTEMS:  CONSTITUTIONAL:  No night sweats.  No fatigue, malaise, lethargy.  No fever or chills.  HEENT:  Eyes:  No visual changes.  No eye pain.      ENT:  No runny nose.  No epistaxis.  No sinus pain.  No sore throat.  No odynophagia.  No ear pain.  No congestion.  RESPIRATORY:  No cough.  No wheeze.  No hemoptysis.  No shortness of breath.  CARDIOVASCULAR:  No chest pains.  No palpitations. No shortness of breath, No orthopnea or PND.  GASTROINTESTINAL:  No abdominal pain.  No nausea or vomiting.  No diarrhea or constipation.  No hematemesis.  No hematochezia.  No melena.  GENITOURINARY:  No urgency.  No frequency.  No dysuria.  No hematuria.  No obstructive symptoms.  No discharge.  No pain.  No significant abnormal bleeding.  MUSCULOSKELETAL:  No musculoskeletal pain.  No joint swelling.  No arthritis.  NEUROLOGICAL:  No tingling or numbness or weakness.  PSYCHIATRIC:  No confusion  SKIN:  No rashes.  No lesions.  No wounds.  ENDOCRINE:  No unexplained weight loss.  No polydipsia.  No polyuria.  No polyphagia.  HEMATOLOGIC:  No anemia.  No purpura.  No petechiae.  No prolonged or excessive bleeding.   ALLERGIC AND IMMUNOLOGIC:  No pruritus.  No swelling.         Vital Signs Last 24 Hrs  T(C): 36.4, Max: 37.2 (04-03 @ 12:53)  T(F): 97.5, Max: 99 (04-03 @ 12:53)  HR: 77 (16 - 122)  BP: 108/63 (108/63 - 146/87)  BP(mean): --  RR: 16 (16 - 19)  SpO2: 99% (98% - 100%)    PHYSICAL EXAM-    Constitutional: The patient appears to be normal, well developed, well nourished and alert and oriented to time, place and person. The patient does not appear acutely ill.     Head: Head is normocephalic and atraumatic.      Neck: The patient's neck is supple without enlargement, has no palpable thyromegaly nor thyroid nodules and has no jugular venous distention. No audible carotid bruits. There are strong carotid pulses bilaterally. No JVD.     Cardiovascular: Regular rate and rhythm without S3, S4.systolic  murmurs 3/6    Respiratory: Breathsounds are normal. No rales. No wheezing.    Abdomen: Soft, nontender, nondistended with positive bowel sounds.      Extremity: No tenderness. No  pitting edema No skin discoloration No clubbing No cyanosis.     Neurologic: The patient is alert and oriented.      Skin: No rash, no obvious lesions noted.      Psychiatric: The patient appears to be emotionally stable.      INTERPRETATION OF TELEMETRY:sinus rythm    ECG: sinus rythm T wave inversion in V3-6, inferior leads.    I&O's Detail    I & Os for current day (as of 03 Apr 2017 16:30)  =============================================  IN:    Oral Fluid: 120 ml    Total IN: 120 ml  ---------------------------------------------  OUT:    Voided: 500 ml    Total OUT: 500 ml  ---------------------------------------------  Total NET: -380 ml      LABS:                        13.4   12.1  )-----------( 211      ( 03 Apr 2017 11:06 )             38.7     03 Apr 2017 11:06    140    |  104    |  13     ----------------------------<  197    3.7     |  26     |  0.96     Ca    8.6        03 Apr 2017 11:06    TPro  7.3    /  Alb  3.3    /  TBili  0.7    /  DBili  x      /  AST  28     /  ALT  15     /  AlkPhos  70     03 Apr 2017 11:06    CARDIAC MARKERS ( 03 Apr 2017 12:49 )  1.110 ng/mL / x     / x     / x     / x      CARDIAC MARKERS ( 03 Apr 2017 11:06 )  1.080 ng/mL / x     / x     / x     / x          PT/INR - ( 03 Apr 2017 11:06 )   PT: 12.2 sec;   INR: 1.13 ratio         PTT - ( 03 Apr 2017 11:06 )  PTT:27.6 sec    I&O's Summary    I & Os for current day (as of 03 Apr 2017 16:30)  =============================================  IN: 120 ml / OUT: 500 ml / NET: -380 ml    BNP  RADIOLOGY & ADDITIONAL STUDIES:
Patient is a 77y old  Female who presents with a chief complaint of Chest pain       HPI:  77 year old female with history of macular degeneration, hyperlipidemia, distant history of tobacco smoking who presents with 5 days of bilateral shoulder pain radiating to substernal area with pressure like pain and burning sensation in neck at rest and on exertion.  Pt also reports fatigue on exertion and mild SOB and recurrent diaphoresis at rest over last 5 days.  She denies past history of cardiac issues.  Stress test in 10/2016 was negative as per pt.  Pt has had carotid sonograms, duplex of legs, echocardiogram over last year for routine check up which were negative as per pt.      Pt underwent left heart cath yesterday and did not reveal any obstructive CAD.  She was noted to have LVOT gradient that was high secondary to basal hyperkinesis and RACHEL on echo.  She denies any dizziness with ambulation.          PAST MEDICAL & SURGICAL HISTORY:  Macular degeneration  Hyperlipidemia, unspecified hyperlipidemia type      MEDICATIONS  (STANDING):  atorvastatin 40milliGRAM(s) Oral at bedtime  docusate sodium 100milliGRAM(s) Oral three times a day  multivitamin 1Tablet(s) Oral daily  ticagrelor 180milliGRAM(s) Oral once    MEDICATIONS  (PRN):  ondansetron Injectable 4milliGRAM(s) IV Push every 6 hours PRN Nausea  senna 2Tablet(s) Oral at bedtime PRN Constipation      FAMILY HISTORY:  No pertinent family history in first degree relatives      SOCIAL HISTORY:  non smoker, no alcohol use      REVIEW OF SYSTEMS:  CONSTITUTIONAL:  No night sweats.  No fatigue, malaise, lethargy.  No fever or chills.  HEENT:  Eyes:  No visual changes.  No eye pain.      ENT:  No runny nose.  No epistaxis.  No sinus pain.  No sore throat.  No odynophagia.  No ear pain.  No congestion.  RESPIRATORY:  No cough.  No wheeze.  No hemoptysis.  No shortness of breath.  CARDIOVASCULAR:  No chest pains.  No palpitations. No shortness of breath, No orthopnea or PND.  GASTROINTESTINAL:  No abdominal pain.  No nausea or vomiting.  No diarrhea or constipation.  No hematemesis.  No hematochezia.  No melena.  GENITOURINARY:  No urgency.  No frequency.  No dysuria.  No hematuria.  No obstructive symptoms.  No discharge.  No pain.  No significant abnormal bleeding.  MUSCULOSKELETAL:  No musculoskeletal pain.  No joint swelling.  No arthritis.  NEUROLOGICAL:  No tingling or numbness or weakness.  PSYCHIATRIC:  No confusion  SKIN:  No rashes.  No lesions.  No wounds.  ENDOCRINE:  No unexplained weight loss.  No polydipsia.  No polyuria.  No polyphagia.  HEMATOLOGIC:  No anemia.  No purpura.  No petechiae.  No prolonged or excessive bleeding.   ALLERGIC AND IMMUNOLOGIC:  No pruritus.  No swelling.         Vital Signs Last 24 Hrs  T(C): 36.4, Max: 37.2 (04-03 @ 12:53)  T(F): 97.5, Max: 99 (04-03 @ 12:53)  HR: 77 (16 - 122)  BP: 108/63 (108/63 - 146/87)  BP(mean): --  RR: 16 (16 - 19)  SpO2: 99% (98% - 100%)    PHYSICAL EXAM-    Constitutional: The patient appears to be normal, well developed, well nourished and alert and oriented to time, place and person. The patient does not appear acutely ill.     Head: Head is normocephalic and atraumatic.      Neck: The patient's neck is supple without enlargement, has no palpable thyromegaly nor thyroid nodules and has no jugular venous distention. No audible carotid bruits. There are strong carotid pulses bilaterally. No JVD.     Cardiovascular: Regular rate and rhythm without S3, S4.systolic  murmurs 3/6    Respiratory: Breathsounds are normal. No rales. No wheezing.    Abdomen: Soft, nontender, nondistended with positive bowel sounds.      Extremity: No tenderness. No  pitting edema No skin discoloration No clubbing No cyanosis.     Neurologic: The patient is alert and oriented.      Skin: No rash, no obvious lesions noted.      Psychiatric: The patient appears to be emotionally stable.      INTERPRETATION OF TELEMETRY:sinus rythm    ECG: sinus rythm T wave inversion in V3-6, inferior leads.    I&O's Detail    I & Os for current day (as of 03 Apr 2017 16:30)  =============================================  IN:    Oral Fluid: 120 ml    Total IN: 120 ml  ---------------------------------------------  OUT:    Voided: 500 ml    Total OUT: 500 ml  ---------------------------------------------  Total NET: -380 ml      LABS:                        13.4   12.1  )-----------( 211      ( 03 Apr 2017 11:06 )             38.7     03 Apr 2017 11:06    140    |  104    |  13     ----------------------------<  197    3.7     |  26     |  0.96     Ca    8.6        03 Apr 2017 11:06    TPro  7.3    /  Alb  3.3    /  TBili  0.7    /  DBili  x      /  AST  28     /  ALT  15     /  AlkPhos  70     03 Apr 2017 11:06    CARDIAC MARKERS ( 03 Apr 2017 12:49 )  1.110 ng/mL / x     / x     / x     / x      CARDIAC MARKERS ( 03 Apr 2017 11:06 )  1.080 ng/mL / x     / x     / x     / x          PT/INR - ( 03 Apr 2017 11:06 )   PT: 12.2 sec;   INR: 1.13 ratio         PTT - ( 03 Apr 2017 11:06 )  PTT:27.6 sec    I&O's Summary    I & Os for current day (as of 03 Apr 2017 16:30)  =============================================  IN: 120 ml / OUT: 500 ml / NET: -380 ml    BNP  RADIOLOGY & ADDITIONAL STUDIES:
HPI: This is a 77 year old female with history of macular degeneration, hyperlipidemia, distant history of tobacco smoking who presents with 5 days of bilateral shoulder pain radiating to substernal area with pressure like pain and burning sensation in neck at rest and on exertion.  Pt also reports fatigue on exertion and mild SOB and recurrent diaphoresis at rest over last 5 days.  She denies past history of cardiac issues.  Stress test in 10/2016 was negative as per pt.  Pt has had carotid sonograms, duplex of legs, echocardiogram over last year for routine check up which were negative as per pt.      Hospital course:  ECG shows T wave inversions in V4-6.  Troponin elevation 1.080 noted.  Patient underwent cardiac cath on 4/3 revealing nonocclusive disease however notable LVOT obstruction on 2D Echo.     Seen this AM, tearful when reminiscing about her  . No CP, occassional left sided neck pain but no HA / fevers / CP or SOB.   ROS: negative unless stated above.     Vital Signs Last 24 Hrs  T(C): 36.7, Max: 37.1 (-03 @ 21:07)  T(F): 98, Max: 98.8 (04-03 @ 21:07)  HR: 73 (66 - 114)  BP: 110/64 (92/54 - 129/73)  BP(mean): 75 (58 - 89)  RR: 18 (0 - 23)  SpO2: 96% (90% - 100%)    PHYSICAL EXAM:  Constitutional: emotional, awake and alert, well-developed  HEENT: PERR, EOMI, Normal Hearing, MMM  Neck: Soft and supple, No LAD, No JVD. Left sided muscular tenderness.   Respiratory: Breath sounds are clear bilaterally, No wheezing, rales or rhonchi  Cardiovascular: S1 and S2, regular rate and rhythm, no Murmurs, gallops or rubs  Gastrointestinal: Bowel Sounds present, soft, nontender, nondistended, no guarding, no rebound  Extremities: No peripheral edema  Vascular: 2+ peripheral pulses  Neurological: A/O x 3, no focal deficits  Musculoskeletal: 5/5 strength b/l upper and lower extremities  Skin: No rashes      MEDICATIONS  (STANDING):  atorvastatin 40milliGRAM(s) Oral at bedtime  docusate sodium 100milliGRAM(s) Oral three times a day  multivitamin 1Tablet(s) Oral daily  metoprolol 25milliGRAM(s) Oral every 12 hours      LABS: All Labs Reviewed:                        11.7   7.4   )-----------( 152      ( 2017 05:45 )             34.2     2017 05:45    145    |  113    |  11     ----------------------------<  101    4.0     |  23     |  0.63     Ca    8.1        2017 05:45  Mg     2.2       2017 05:45    TPro  7.3    /  Alb  3.3    /  TBili  0.7    /  DBili  x      /  AST  28     /  ALT  15     /  AlkPhos  70     2017 11:06    PT/INR - ( 2017 05:45 )   PT: 11.9 sec;   INR: 1.10 ratio         PTT - ( 2017 05:45 )  PTT:28.5 sec  CARDIAC MARKERS ( 2017 10:11 )  0.732 ng/mL / x     / 45 U/L / x     / x      CARDIAC MARKERS ( 2017 02:27 )  0.922 ng/mL / x     / 45 U/L / x     / x      CARDIAC MARKERS ( 2017 20:22 )  0.831 ng/mL / x     / 53 U/L / x     / x      CARDIAC MARKERS ( 2017 12:49 )  1.110 ng/mL / x     / x     / x     / x      CARDIAC MARKERS ( 2017 11:06 )  1.080 ng/mL / x     / x     / x     / x          Images:     Vital Signs Last 24 Hrs  T(C): 36.7, Max: 37.1 ( @ 21:07)  T(F): 98, Max: 98.8 ( @ 21:07)  HR: 73 (66 - 114)  BP: 110/64 (92/54 - 129/73)  BP(mean): 75 (58 - 89)  RR: 18 (0 - 23)  SpO2: 96% (90% - 100%)  PHYSICAL EXAM:    Constitutional: NAD, awake and alert, well-developed  HEENT: PERR, EOMI, Normal Hearing, MMM  Neck: Soft and supple, No LAD, No JVD  Respiratory: Breath sounds are clear bilaterally, No wheezing, rales or rhonchi  Cardiovascular: S1 and S2, regular rate and rhythm, no Murmurs, gallops or rubs  Gastrointestinal: Bowel Sounds present, soft, nontender, nondistended, no guarding, no rebound  Extremities: No peripheral edema  Vascular: 2+ peripheral pulses  Neurological: A/O x 3, no focal deficits  Musculoskeletal: 5/5 strength b/l upper and lower extremities  Skin: No rashes    MEDICATIONS:  MEDICATIONS  (STANDING):  atorvastatin 40milliGRAM(s) Oral at bedtime  docusate sodium 100milliGRAM(s) Oral three times a day  multivitamin 1Tablet(s) Oral daily  metoprolol 25milliGRAM(s) Oral every 12 hours      LABS: All Labs Reviewed:                        11.7   7.4   )-----------( 152      ( 2017 05:45 )             34.2     2017 05:45    145    |  113    |  11     ----------------------------<  101    4.0     |  23     |  0.63     Ca    8.1        2017 05:45  Mg     2.2       2017 05:45    TPro  7.3    /  Alb  3.3    /  TBili  0.7    /  DBili  x      /  AST  28     /  ALT  15     /  AlkPhos  70     2017 11:06    PT/INR - ( 2017 05:45 )   PT: 11.9 sec;   INR: 1.10 ratio         PTT - ( 2017 05:45 )  PTT:28.5 sec  CARDIAC MARKERS ( 2017 10:11 )  0.732 ng/mL / x     / 45 U/L / x     / x      CARDIAC MARKERS ( 2017 02:27 )  0.922 ng/mL / x     / 45 U/L / x     / x      CARDIAC MARKERS ( 2017 20:22 )  0.831 ng/mL / x     / 53 U/L / x     / x      CARDIAC MARKERS ( 2017 12:49 )  1.110 ng/mL / x     / x     / x     / x      CARDIAC MARKERS ( 2017 11:06 )  1.080 ng/mL / x     / x     / x     / x            Vital Signs Last 24 Hrs  T(C): 36.7, Max: 37.1 ( @ 21:07)  T(F): 98, Max: 98.8 ( @ 21:07)  HR: 73 (66 - 114)  BP: 110/64 (92/54 - 129/73)  BP(mean): 75 (58 - 89)  RR: 18 (0 - 23)  SpO2: 96% (90% - 100%)  PHYSICAL EXAM:    Constitutional: NAD, awake and alert, well-developed  HEENT: PERR, EOMI, Normal Hearing, MMM  Neck: Soft and supple, No LAD, No JVD  Respiratory: Breath sounds are clear bilaterally, No wheezing, rales or rhonchi  Cardiovascular: S1 and S2, regular rate and rhythm, no Murmurs, gallops or rubs  Gastrointestinal: Bowel Sounds present, soft, nontender, nondistended, no guarding, no rebound  Extremities: No peripheral edema  Vascular: 2+ peripheral pulses  Neurological: A/O x 3, no focal deficits  Musculoskeletal: 5/5 strength b/l upper and lower extremities  Skin: No rashes    MEDICATIONS:  MEDICATIONS  (STANDING):  atorvastatin 40milliGRAM(s) Oral at bedtime  docusate sodium 100milliGRAM(s) Oral three times a day  multivitamin 1Tablet(s) Oral daily  metoprolol 25milliGRAM(s) Oral every 12 hours      LABS: All Labs Reviewed:                        11.7   7.4   )-----------( 152      ( 2017 05:45 )             34.2     2017 05:45    145    |  113    |  11     ----------------------------<  101    4.0     |  23     |  0.63     Ca    8.1        2017 05:45  Mg     2.2       2017 05:45    TPro  7.3    /  Alb  3.3    /  TBili  0.7    /  DBili  x      /  AST  28     /  ALT  15     /  AlkPhos  70     2017 11:06    PT/INR - ( 2017 05:45 )   PT: 11.9 sec;   INR: 1.10 ratio         PTT - ( 2017 05:45 )  PTT:28.5 sec  CARDIAC MARKERS ( 2017 10:11 )  0.732 ng/mL / x     / 45 U/L / x     / x      CARDIAC MARKERS ( 2017 02:27 )  0.922 ng/mL / x     / 45 U/L / x     / x      CARDIAC MARKERS ( 2017 20:22 )  0.831 ng/mL / x     / 53 U/L / x     / x      CARDIAC MARKERS ( 2017 12:49 )  1.110 ng/mL / x     / x     / x     / x      CARDIAC MARKERS ( 2017 11:06 )  1.080 ng/mL / x     / x     / x     / x        Lipid Profile (17 @ 05:45)    HDL/Total Cholesterol Ratio Measurement: 2.5 RATIO    Cholesterol, Serum: 116 mg/dL    Triglycerides, Serum: 53 mg/dL    HDL Cholesterol, Serum: 47 mg/dL    Direct LDL: 58: LDL Cholesterol --- Interpretive Comment (for adults 18 and over)        ECHO:   There is mild thickening of anterior mitral valve leaflet. The leaflet   opening is normal. Moderate (2+) mitral regurgitation is present.   Systolic   anterior motion of the mitral valve leaflet is seen.   Takotsubos ( stress) cardiomyopathy suspected. The left ventricle is   normal in size.The basal septal and lateral walls cotractvigorously. The   remaining LV segments are moderately hypokinetic. LV outflow obstruction   is seen secondary to systolic anterior motion of the mitral valve and   chordae apparatus. Peak outflow gradient is approximately 120 mm   Hg.Sigmoid septum noted.   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 Color flow doppler is suggestive of   a left to right shunt; this finding is consistent with a PFO.

## 2017-04-05 NOTE — DISCHARGE NOTE ADULT - MEDICATION SUMMARY - MEDICATIONS TO TAKE
I will START or STAY ON the medications listed below when I get home from the hospital:    D3 2000 oral tablet  -- 2000 milligram(s) by mouth once a day  -- Indication: For Vitamin    visivite (for eyes)  --  by mouth once a day  -- Indication: For Eyes    aspirin 81 mg oral tablet, chewable  -- 1 tab(s) by mouth once a day  -- Indication: For CaD    atorvastatin 40 mg oral tablet  -- 1 tab(s) by mouth once a day (at bedtime)  -- Indication: For CaD    metoprolol succinate 50 mg oral tablet, extended release  -- 1 tab(s) by mouth once a day  -- It is very important that you take or use this exactly as directed.  Do not skip doses or discontinue unless directed by your doctor.  May cause drowsiness.  Alcohol may intensify this effect.  Use care when operating dangerous machinery.  Some non-prescription drugs may aggravate your condition.  Read all labels carefully.  If a warning appears, check with your doctor before taking.  Swallow whole.  Do not crush.  Take with food or milk.  This drug may impair the ability to drive or operate machinery.  Use care until you become familiar with its effects.    -- Indication: For Cad    Mag-200  --  by mouth once a day  -- Indication: For Magnesium    Fish Oil oral capsule  --  by mouth once a day  -- Indication: For Fish Oil    multivitamin  --  by mouth once a day  -- Indication: For Vitamin I will START or STAY ON the medications listed below when I get home from the hospital:    D3 2000 oral tablet  -- 2000 milligram(s) by mouth once a day  -- Indication: For Vitamin    visivite (for eyes)  --  by mouth once a day  -- Indication: For Eyes    aspirin 81 mg oral tablet, chewable  -- 1 tab(s) by mouth once a day  -- Indication: For CaD    metoprolol succinate 50 mg oral tablet, extended release  -- 1 tab(s) by mouth once a day  -- It is very important that you take or use this exactly as directed.  Do not skip doses or discontinue unless directed by your doctor.  May cause drowsiness.  Alcohol may intensify this effect.  Use care when operating dangerous machinery.  Some non-prescription drugs may aggravate your condition.  Read all labels carefully.  If a warning appears, check with your doctor before taking.  Swallow whole.  Do not crush.  Take with food or milk.  This drug may impair the ability to drive or operate machinery.  Use care until you become familiar with its effects.    -- Indication: For Cad    Mag-200  --  by mouth once a day  -- Indication: For Magnesium    Fish Oil oral capsule  --  by mouth once a day  -- Indication: For Fish Oil    multivitamin  --  by mouth once a day  -- Indication: For Vitamin

## 2017-04-05 NOTE — DISCHARGE NOTE ADULT - CARE PROVIDER_API CALL
Maribel Ivey (BEST), Cardiovascular Disease; Internal Medicine  [address] [place] [address]  Phone: [phone]  Fax: [phone]

## 2017-04-05 NOTE — PROGRESS NOTE ADULT - ASSESSMENT
This is a 77 year old female with history of macular degeneration, hyperlipidemia, distant history of tobacco smoking who presents with 5 days of bilateral shoulder pain radiating to substernal area with pressure like pain and burning sensation in neck at rest and on exertion.  Underwent cardiac cath for troponenima found to have nonocclusive disease however with stress induced cardiomyopathy:
Stress induced cardiomyopathy- likely secondary to stressors in life at this time, 's anniversary.  s/p hydration with IVF.   Repeat echo showed improvement in her LVOT gradient.  Will start her on toprol XL 50mg po daily starting tomorrow.  Will start ACEI as outpt.  f/u with me in one week after discharge in office.      Hyperlipidemia- continue statin.    Thank you for allowing me to participate in the care of this patient. Please feel free to contact me with any questions.
Stress induced cardiomyopathy- likely secondary to stressors in life at this time, 's anniversary.  s/p hydration with IVF. Check limited echo today to asses LVOT gradient.  continue metoprolol with her borderline BP.  Later will switch to toprol XL and start ACEI as outpt.    Hyperlipidemia- continue statin.    Thank you for allowing me to participate in the care of this patient. Please feel free to contact me with any questions.

## 2017-04-05 NOTE — DISCHARGE NOTE ADULT - MEDICATION SUMMARY - MEDICATIONS TO STOP TAKING
I will STOP taking the medications listed below when I get home from the hospital:    Livalo  --  by mouth   -- pt states "prescribed by cardiologist but stopped taken when c/p starting occuring." I will STOP taking the medications listed below when I get home from the hospital:  None

## 2017-04-05 NOTE — DISCHARGE NOTE ADULT - PATIENT PORTAL LINK FT
“You can access the [facility] Patient Portal, offered by [facility], by registering with the following website: [email or web address]”

## 2017-04-05 NOTE — DISCHARGE NOTE ADULT - HOSPITAL COURSE
HPI: This is a 77 year old female with history of macular degeneration, hyperlipidemia, distant history of tobacco smoking who presents with 5 days of bilateral shoulder pain radiating to substernal area with pressure like pain and burning sensation in neck at rest and on exertion.  Pt also reports fatigue on exertion and mild SOB and recurrent diaphoresis at rest over last 5 days.  She denies past history of cardiac issues.  Stress test in 10/2016 was negative as per pt.  Pt has had carotid sonograms, duplex of legs, echocardiogram over last year for routine check up which were negative as per pt.      Hospital course:  ECG shows T wave inversions in V4-6.  Troponin elevation 1.080 noted.  Patient underwent cardiac cath on 4/3 revealing nonocclusive disease however notable LVOT obstruction on 2D Echo. After hydration Echo repeated and revealed improved hypokinesis and decreased LVOT.    Patient seen this AM.       ROS: negative unless stated above.     Vital Signs Last 24 Hrs  T(C): 36.6, Max: 36.9 (04-04 @ 15:51)  T(F): 97.8, Max: 98.5 (04-04 @ 15:51)  HR: 64 (64 - 86)  BP: 138/75 (92/54 - 138/75)  BP(mean): 88 (58 - 88)  RR: 15 (14 - 20)  SpO2: 98% (96% - 98%)    PHYSICAL EXAM:  Constitutional: emotional, awake and alert, well-developed  HEENT: PERR, EOMI, Normal Hearing, MMM  Neck: Soft and supple, No LAD, No JVD. Left sided muscular tenderness.   Respiratory: Breath sounds are clear bilaterally, No wheezing, rales or rhonchi  Cardiovascular: S1 and S2, regular rate and rhythm, no Murmurs, gallops or rubs  Gastrointestinal: Bowel Sounds present, soft, nontender, nondistended, no guarding, no rebound  Extremities: No peripheral edema  Vascular: 2+ peripheral pulses  Neurological: A/O x 3, no focal deficits  Musculoskeletal: 5/5 strength b/l upper and lower extremities  Skin: No rashes                        11.1   6.6   )-----------( 180      ( 05 Apr 2017 05:45 )             31.9   04-04    145  |  113<H>  |  11  ----------------------------<  101<H>  4.0   |  23  |  0.63    Ca    8.1<L>      04 Apr 2017 05:45  Mg     2.2     04-04    TPro  7.3  /  Alb  3.3  /  TBili  0.7  /  DBili  x   /  AST  28  /  ALT  15  /  AlkPhos  70  04-03    ECHO:   There is mild thickening of anterior mitral valve leaflet. The leaflet   opening is normal. Moderate (2+) mitral regurgitation is present.   Systolic   anterior motion of the mitral valve leaflet is seen.   Takotsubos ( stress) cardiomyopathy suspected. The left ventricle is   normal in size.The basal septal and lateral walls cotractvigorously. The   remaining LV segments are moderately hypokinetic. LV outflow obstruction   is seen secondary to systolic anterior motion of the mitral valve and   chordae apparatus. Peak outflow gradient is approximately 120 mm   Hg.Sigmoid septum noted.   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 Color flow doppler is suggestive of   a left to right shunt; this finding is consistent with a PFO.    REPEAT ECHO:   There is mild thickening of anterior mitral valve leaflet. The leaflet   opening is normal. Moderate (2+) mitral regurgitation is present.   Systolic   anterior motion of the mitral valve leaflet is seen. when compared to   study of 4/03/2017, the outflow tract turbulence is no longer present   the apical septal and apical septal segments are severly hypokinetic. All   other LV segment contract normally. EF estimated at 55-60%. This   represents significant improvement in LV function when compared to   4/03/2017 study.    This is a 77 year old female with history of macular degeneration, hyperlipidemia, distant history of tobacco smoking who presents with 5 days of bilateral shoulder pain radiating to substernal area with pressure like pain and burning sensation in neck at rest and on exertion.  Underwent cardiac cath for troponenima found to have nonocclusive disease however with stress induced cardiomyopathy:       Problem/Plan - 1:  ·  Problem: NSTEMI (non-ST elevated myocardial infarction).  Plan: - NSTEMI from stress induced cardiomyopathy. Non-occlusive disease noted on cardiac cath (4/3).   - currently stress pain free.   - tolerating BB --> switch to Toprol XL 50mg upon dc and for outpatient initiation of ACEI.   - will give Asp and Statin upon dc for CAD.   - discussed with Cards.   - improved LVOT.       Problem/Plan - 2:  ·  Problem: Hyperlipidemia, unspecified hyperlipidemia type.  Plan: - History of hyperlipidemia   - Manage with statin   - Lipid profile with low LDL.     Total time 50 mins HPI: This is a 77 year old female with history of macular degeneration, hyperlipidemia, distant history of tobacco smoking who presents with 5 days of bilateral shoulder pain radiating to substernal area with pressure like pain and burning sensation in neck at rest and on exertion.  Pt also reports fatigue on exertion and mild SOB and recurrent diaphoresis at rest over last 5 days.  She denies past history of cardiac issues.  Stress test in 10/2016 was negative as per pt.  Pt has had carotid sonograms, duplex of legs, echocardiogram over last year for routine check up which were negative as per pt.      Hospital course:  ECG shows T wave inversions in V4-6.  Troponin elevation 1.080 noted.  Patient underwent cardiac cath on 4/3 revealing nonocclusive disease however notable LVOT obstruction on 2D Echo. After hydration Echo repeated and revealed improved hypokinesis and decreased LVOT.    Patient seen this AM. No CP or neck pain. In better spirits. States having intolerance to statins with muscle cramps but does well with Livalo (has at home).      ROS: negative unless stated above.     Vital Signs Last 24 Hrs  T(C): 36.6, Max: 36.9 (04-04 @ 15:51)  T(F): 97.8, Max: 98.5 (04-04 @ 15:51)  HR: 64 (64 - 86)  BP: 138/75 (92/54 - 138/75)  BP(mean): 88 (58 - 88)  RR: 15 (14 - 20)  SpO2: 98% (96% - 98%)    PHYSICAL EXAM:  Constitutional: emotional, awake and alert, well-developed  HEENT: PERR, EOMI, Normal Hearing, MMM  Neck: Soft and supple, No LAD, No JVD. Left sided muscular tenderness.   Respiratory: Breath sounds are clear bilaterally, No wheezing, rales or rhonchi  Cardiovascular: S1 and S2, regular rate and rhythm, no Murmurs, gallops or rubs  Gastrointestinal: Bowel Sounds present, soft, nontender, nondistended, no guarding, no rebound  Extremities: No peripheral edema  Vascular: 2+ peripheral pulses  Neurological: A/O x 3, no focal deficits  Musculoskeletal: 5/5 strength b/l upper and lower extremities  Skin: No rashes                        11.1   6.6   )-----------( 180      ( 05 Apr 2017 05:45 )             31.9   04-04    145  |  113<H>  |  11  ----------------------------<  101<H>  4.0   |  23  |  0.63    Ca    8.1<L>      04 Apr 2017 05:45  Mg     2.2     04-04    TPro  7.3  /  Alb  3.3  /  TBili  0.7  /  DBili  x   /  AST  28  /  ALT  15  /  AlkPhos  70  04-03    ECHO:   There is mild thickening of anterior mitral valve leaflet. The leaflet   opening is normal. Moderate (2+) mitral regurgitation is present.   Systolic   anterior motion of the mitral valve leaflet is seen.   Takotsubos ( stress) cardiomyopathy suspected. The left ventricle is   normal in size.The basal septal and lateral walls cotractvigorously. The   remaining LV segments are moderately hypokinetic. LV outflow obstruction   is seen secondary to systolic anterior motion of the mitral valve and   chordae apparatus. Peak outflow gradient is approximately 120 mm   Hg.Sigmoid septum noted.   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 Color flow doppler is suggestive of   a left to right shunt; this finding is consistent with a PFO.    REPEAT ECHO:   There is mild thickening of anterior mitral valve leaflet. The leaflet   opening is normal. Moderate (2+) mitral regurgitation is present.   Systolic   anterior motion of the mitral valve leaflet is seen. when compared to   study of 4/03/2017, the outflow tract turbulence is no longer present   the apical septal and apical septal segments are severly hypokinetic. All   other LV segment contract normally. EF estimated at 55-60%. This   represents significant improvement in LV function when compared to   4/03/2017 study.    This is a 77 year old female with history of macular degeneration, hyperlipidemia, distant history of tobacco smoking who presents with 5 days of bilateral shoulder pain radiating to substernal area with pressure like pain and burning sensation in neck at rest and on exertion.  Underwent cardiac cath for troponenima found to have nonocclusive disease however with stress induced cardiomyopathy:       Problem/Plan - 1:  ·  Problem: NSTEMI (non-ST elevated myocardial infarction).  Plan: - NSTEMI from stress induced cardiomyopathy. Non-occlusive disease noted on cardiac cath (4/3).   - currently stress pain free.   - tolerating BB --> switch to Toprol XL 50mg upon dc and for outpatient initiation of ACEI.   - will give Asp and Statin upon dc for CAD.   - discussed with Cards.   - improved LVOT.       Problem/Plan - 2:  ·  Problem: Hyperlipidemia, unspecified hyperlipidemia type.  Plan: - History of hyperlipidemia   - Manage with Livalo.  - Lipid profile with low LDL.     Total time 45 mins

## 2017-04-05 NOTE — DISCHARGE NOTE ADULT - CARE PLAN
Principal Discharge DX:	Stress-induced cardiomyopathy  Goal:	No chest pain or difficulty breathing  Instructions for follow-up, activity and diet:	Follow up with the Cardiologist in 1 week for repeat blood pressure check.

## 2017-04-07 DIAGNOSIS — E86.1 HYPOVOLEMIA: ICD-10-CM

## 2017-04-07 DIAGNOSIS — Z87.891 PERSONAL HISTORY OF NICOTINE DEPENDENCE: ICD-10-CM

## 2017-04-07 DIAGNOSIS — I51.81 TAKOTSUBO SYNDROME: ICD-10-CM

## 2017-04-07 DIAGNOSIS — R94.30 ABNORMAL RESULT OF CARDIOVASCULAR FUNCTION STUDY, UNSPECIFIED: ICD-10-CM

## 2017-04-07 DIAGNOSIS — E78.5 HYPERLIPIDEMIA, UNSPECIFIED: ICD-10-CM

## 2017-04-07 DIAGNOSIS — I21.4 NON-ST ELEVATION (NSTEMI) MYOCARDIAL INFARCTION: ICD-10-CM

## 2017-04-07 DIAGNOSIS — Z88.2 ALLERGY STATUS TO SULFONAMIDES: ICD-10-CM

## 2017-04-07 DIAGNOSIS — H35.30 UNSPECIFIED MACULAR DEGENERATION: ICD-10-CM

## 2017-11-21 NOTE — ED STATDOCS - CPE ED RESP NORM

## 2018-08-20 PROBLEM — E78.5 HYPERLIPIDEMIA, UNSPECIFIED: Chronic | Status: ACTIVE | Noted: 2017-04-03

## 2018-08-20 PROBLEM — H35.30 UNSPECIFIED MACULAR DEGENERATION: Chronic | Status: ACTIVE | Noted: 2017-04-03

## 2018-08-20 PROBLEM — H26.9 UNSPECIFIED CATARACT: Chronic | Status: ACTIVE | Noted: 2017-04-03

## 2018-08-23 ENCOUNTER — OUTPATIENT (OUTPATIENT)
Dept: OUTPATIENT SERVICES | Facility: HOSPITAL | Age: 78
LOS: 1 days | End: 2018-08-23

## 2018-08-23 ENCOUNTER — OUTPATIENT (OUTPATIENT)
Dept: OUTPATIENT SERVICES | Facility: HOSPITAL | Age: 78
LOS: 1 days | End: 2018-08-23
Payer: MEDICARE

## 2018-08-23 ENCOUNTER — APPOINTMENT (OUTPATIENT)
Dept: RADIOLOGY | Facility: CLINIC | Age: 78
End: 2018-08-23
Payer: MEDICARE

## 2018-08-23 DIAGNOSIS — Z00.8 ENCOUNTER FOR OTHER GENERAL EXAMINATION: ICD-10-CM

## 2018-08-23 DIAGNOSIS — Z90.49 ACQUIRED ABSENCE OF OTHER SPECIFIED PARTS OF DIGESTIVE TRACT: Chronic | ICD-10-CM

## 2018-08-23 PROCEDURE — 77080 DXA BONE DENSITY AXIAL: CPT | Mod: 26

## 2018-08-23 PROCEDURE — 77080 DXA BONE DENSITY AXIAL: CPT

## 2021-05-21 NOTE — DISCHARGE NOTE ADULT - FUNCTIONAL SCREEN CURRENT LEVEL: AMBULATION, MLM
5/21/2021 Patient: Travis Anderson YOB: 1955 Date of Visit: 5/21/2021 Jenna Martinez, [address] [address] Via Fax: [phone] Dear Jenna Martinez MD, Thank you for referring Ms. Viridiana Boone to [facility] [address], THROAT AND ALLERGY CARE for evaluation. My notes for this consultation are attached. If you have questions, please do not hesitate to call me. I look forward to following your patient along with you. Sincerely, Herman Hernandez MD 
 
 (0) independent

## 2021-06-08 NOTE — DISCHARGE NOTE ADULT - CAREGIVER ADDRESS
NEUROSURGERY CONSULTATION NOTE    Kacie Mas   [address]   [facility] [address]  63 y.o. female is sent to me in consultation   by Aixa Newell MD     CC:    Chief Complaint   Patient presents with     Back Pain       HPI: Neurosurgery consultation was requested by:  {Aixa Newell MD  Pain location: back    Radicular Pain is present: left leg pain to left big toe   Lhermitte sign: denies     Motor complaints: left foot weakness  Sensory complaints: Burning, tingling and numbness in the left leg down to left big toe  Gait and balance issues: yes   Bowel or bladder issues: denies      Duration of SX is: 9 months  The symptoms are worse with: walking, lifting, twisting, bending over  The symptoms are better with: rest, taking warm baths  Injury: denies     Severity is: moderate/severe     Patient has tried the following conservative measures: PT- not helpful , Injections - short term relief up to 2 weeks  She had some residual left L4 symptoms and this is new over the past 9 months.  The radiation seems to be an L5 distribution.   MOOSE score is: 42 %           PROBLEM LIST:  1.  Lumbar radiculopathy  2.  Arthritis  3.  Left leg weakness  4.  Instability of gait     REVIEW OF SYSTEMS:  A 12 point review of systems is essentially negative other than the symptoms listed in HPI.      Past Medical History:   Diagnosis Date     Anxiety      Foot drop, left foot          Past Surgical History:   Procedure Laterality Date     BACK SURGERY  09/2005    L1 & L5     CYST REMOVAL  09/2005    lumber     GASTRIC BYPASS  02/2015         MEDICATIONS:  Current Outpatient Prescriptions   Medication Sig Dispense Refill     DULoxetine (CYMBALTA) 60 MG capsule Take 60 mg by mouth 2 (two) times a day.       gabapentin (NEURONTIN) 300 MG capsule Take 300 mg by mouth 3 (three) times a day.       propranolol (INDERAL) 10 MG tablet Take 10 mg by mouth 3 (three) times a day.       tiZANidine (ZANAFLEX) 4 MG tablet Take 4 mg by  "mouth every 6 (six) hours as needed.       No current facility-administered medications for this visit.          ALLERGIES/SENSITIVITIES:     No Known Allergies    PERTINENT SOCIAL HISTORY:   Social History     Social History     Marital status:      Spouse name: N/A     Number of children: N/A     Years of education: N/A     Social History Main Topics     Smoking status: Never Smoker     Smokeless tobacco: Never Used     Alcohol use No     Drug use: No     Sexual activity: Not Asked       FAMILY HISTORY:  Family History   Problem Relation Age of Onset     Hypertension Mother      Canavan disease Father         PHYSICAL EXAM:   Constitutional:   Visit Vitals     /76     Pulse 84     Ht 5' 6.5\" (1.689 m)     Wt 218 lb 9.6 oz (99.2 kg)     SpO2 97%     BMI 34.75 kg/m2       General appearance: Appropriately groomed.  No acute distress.  Interactive.     Mental Status: Mental status: Alert and oriented, mood and affect appropriate, language reception and expression normal, recent and remote memory is normal, higher cortical function normal. Attention span, concentration and ability to follow commands is normal.       Cranial Nerves: Face is symmetric.  Extraocular movements are full, conjugate and without nystagmus.  Hearing is preserved.  Shoulder position is symmetric.  Tongue is midline with normal motion.  Palate elevates symmetrically.     Motor: Weakness of the left quad at 5 minus/5, left foot drop.  Right lower extremity appears to have normal strength in all muscle groups.  She also has some swelling in the left calf.     Sensory: Sensory exam by subjective report intact to LT,PP,Position and Vib. in the UE and  LE, with the exception of sensory changes in L4 and L5 and the left leg.     Station and Gait:  Unstable wide stance gait     Reflexes; increased reflexes noted in the upper extremities and the knees    IMAGING:  I have personally reviewed the images and discussed the findings with Kacei CURRY" Chace.  She has mild-to-moderate stenosis at T12-L1.  She has multilevel degenerative changes in the spinal canal.  She has anterior listhesis of L4 relative to L5 with a disc bulge.  This combination produces moderate canal stenosis.  There is compression of the left L5 traversing nerve root.  She has lumbar stenosis at L3-4.  There is no significant spinal canal or neural foraminal stenosis at L5-S1.      ASSESSMENT:  1.  Lumbar spinal stenosis with radiculopathy  2.  Possible cervical spinal stenosis    PLAN:  I have asked that she had an EMG and a cervical MRI scan.  I will have her come back so we can review these things.  He may be a candidate for an L4-5 and L3-4 decompression approaching from the left side.    CC:     Aixa Newell MDTipp[facility] County Hospital ANYI [place] [address]      [address] [place], [place] [address]

## 2022-01-28 NOTE — DISCHARGE NOTE ADULT - MEDICATION SUMMARY - MEDICATIONS TO CHANGE
Acute/Chronic Sinusitis  COPD    Plan:  1.  Levaquin 500 mgs  For 14 days - can stop after 7 or 10 days IF completely back to normal  2.  Continue use of sinus rinses - at least twice daily  3.  Add antihistamine daily - zyrtec  (OTC)  4.  If not improved in 10 days, please call Phil Brock RN or colleagues [phone]  5.  Continue current COPD medications     I will SWITCH the dose or number of times a day I take the medications listed below when I get home from the hospital:  None
